# Patient Record
Sex: FEMALE | Race: WHITE | NOT HISPANIC OR LATINO | Employment: FULL TIME | URBAN - METROPOLITAN AREA
[De-identification: names, ages, dates, MRNs, and addresses within clinical notes are randomized per-mention and may not be internally consistent; named-entity substitution may affect disease eponyms.]

---

## 2022-01-27 ENCOUNTER — OFFICE VISIT (OUTPATIENT)
Dept: OBSTETRICS AND GYNECOLOGY | Facility: CLINIC | Age: 22
End: 2022-01-27
Payer: MEDICAID

## 2022-01-27 VITALS
HEIGHT: 61 IN | RESPIRATION RATE: 16 BRPM | WEIGHT: 135.81 LBS | DIASTOLIC BLOOD PRESSURE: 66 MMHG | SYSTOLIC BLOOD PRESSURE: 110 MMHG | BODY MASS INDEX: 25.64 KG/M2

## 2022-01-27 DIAGNOSIS — N92.1 BREAKTHROUGH BLEEDING ON DEPO PROVERA: ICD-10-CM

## 2022-01-27 DIAGNOSIS — Z30.9 ENCOUNTER FOR CONTRACEPTIVE MANAGEMENT, UNSPECIFIED TYPE: Primary | ICD-10-CM

## 2022-01-27 PROCEDURE — 99999 PR PBB SHADOW E&M-NEW PATIENT-LVL III: ICD-10-PCS | Mod: PBBFAC,,, | Performed by: STUDENT IN AN ORGANIZED HEALTH CARE EDUCATION/TRAINING PROGRAM

## 2022-01-27 PROCEDURE — 1159F MED LIST DOCD IN RCRD: CPT | Mod: CPTII,,, | Performed by: STUDENT IN AN ORGANIZED HEALTH CARE EDUCATION/TRAINING PROGRAM

## 2022-01-27 PROCEDURE — 1159F PR MEDICATION LIST DOCUMENTED IN MEDICAL RECORD: ICD-10-PCS | Mod: CPTII,,, | Performed by: STUDENT IN AN ORGANIZED HEALTH CARE EDUCATION/TRAINING PROGRAM

## 2022-01-27 PROCEDURE — 3078F PR MOST RECENT DIASTOLIC BLOOD PRESSURE < 80 MM HG: ICD-10-PCS | Mod: CPTII,,, | Performed by: STUDENT IN AN ORGANIZED HEALTH CARE EDUCATION/TRAINING PROGRAM

## 2022-01-27 PROCEDURE — 99203 PR OFFICE/OUTPT VISIT, NEW, LEVL III, 30-44 MIN: ICD-10-PCS | Mod: S$PBB,,, | Performed by: STUDENT IN AN ORGANIZED HEALTH CARE EDUCATION/TRAINING PROGRAM

## 2022-01-27 PROCEDURE — 3074F SYST BP LT 130 MM HG: CPT | Mod: CPTII,,, | Performed by: STUDENT IN AN ORGANIZED HEALTH CARE EDUCATION/TRAINING PROGRAM

## 2022-01-27 PROCEDURE — 99999 PR PBB SHADOW E&M-NEW PATIENT-LVL III: CPT | Mod: PBBFAC,,, | Performed by: STUDENT IN AN ORGANIZED HEALTH CARE EDUCATION/TRAINING PROGRAM

## 2022-01-27 PROCEDURE — 99203 OFFICE O/P NEW LOW 30 MIN: CPT | Mod: S$PBB,,, | Performed by: STUDENT IN AN ORGANIZED HEALTH CARE EDUCATION/TRAINING PROGRAM

## 2022-01-27 PROCEDURE — 3078F DIAST BP <80 MM HG: CPT | Mod: CPTII,,, | Performed by: STUDENT IN AN ORGANIZED HEALTH CARE EDUCATION/TRAINING PROGRAM

## 2022-01-27 PROCEDURE — 99203 OFFICE O/P NEW LOW 30 MIN: CPT | Mod: PBBFAC,PN | Performed by: STUDENT IN AN ORGANIZED HEALTH CARE EDUCATION/TRAINING PROGRAM

## 2022-01-27 PROCEDURE — 3008F PR BODY MASS INDEX (BMI) DOCUMENTED: ICD-10-PCS | Mod: CPTII,,, | Performed by: STUDENT IN AN ORGANIZED HEALTH CARE EDUCATION/TRAINING PROGRAM

## 2022-01-27 PROCEDURE — 3074F PR MOST RECENT SYSTOLIC BLOOD PRESSURE < 130 MM HG: ICD-10-PCS | Mod: CPTII,,, | Performed by: STUDENT IN AN ORGANIZED HEALTH CARE EDUCATION/TRAINING PROGRAM

## 2022-01-27 PROCEDURE — 3008F BODY MASS INDEX DOCD: CPT | Mod: CPTII,,, | Performed by: STUDENT IN AN ORGANIZED HEALTH CARE EDUCATION/TRAINING PROGRAM

## 2022-01-27 NOTE — PROGRESS NOTES
History & Physical  Gynecology      SUBJECTIVE:     Chief Complaint: BTB on Depo       History of Present Illness:    21 y.o. patient new to me presents today for BTB on depo. Has been on depo for 7 months. Having irregular spotty bleeding on depo. Looking to switch birth control.    7 months ago, uncomplicated.       Review of patient's allergies indicates:   Allergen Reactions    Keflex [cephalexin]        History reviewed. No pertinent past medical history.  Past Surgical History:   Procedure Laterality Date    TONSILLECTOMY       OB History    No obstetric history on file.       Family History   Problem Relation Age of Onset    Breast cancer Neg Hx     Colon cancer Neg Hx     Ovarian cancer Neg Hx      Social History     Tobacco Use    Smoking status: Current Some Day Smoker    Smokeless tobacco: Never Used   Substance Use Topics    Alcohol use: Not Currently    Drug use: Never       No current outpatient medications on file.     No current facility-administered medications for this visit.         Review of Systems:  Review of Systems   Constitutional: Negative for chills, fatigue and fever.   HENT: Negative for congestion.    Eyes: Negative for visual disturbance.   Respiratory: Negative for cough and shortness of breath.    Cardiovascular: Negative for chest pain and palpitations.   Gastrointestinal: Negative for abdominal distention, abdominal pain, constipation, diarrhea, nausea and vomiting.   Genitourinary: Negative for difficulty urinating, dysuria, hematuria, vaginal bleeding and vaginal discharge.   Skin: Negative for rash.   Neurological: Negative for dizziness, seizures, light-headedness and headaches.   Hematological: Does not bruise/bleed easily.   Psychiatric/Behavioral: Negative for dysphoric mood. The patient is not nervous/anxious.         OBJECTIVE:     Physical Exam:  Physical Exam  Vitals reviewed.   Constitutional:       General: She is not in acute distress.     Appearance:  Normal appearance. She is well-developed.   HENT:      Head: Normocephalic and atraumatic.   Cardiovascular:      Rate and Rhythm: Normal rate and regular rhythm.   Pulmonary:      Effort: Pulmonary effort is normal.   Abdominal:      General: There is no distension.      Palpations: Abdomen is soft.   Genitourinary:     Vagina: Normal.   Skin:     General: Skin is warm.   Neurological:      Mental Status: She is alert and oriented to person, place, and time.   Psychiatric:         Behavior: Behavior normal.         Thought Content: Thought content normal.         Judgment: Judgment normal.           ASSESSMENT:       ICD-10-CM ICD-9-CM    1. Encounter for contraceptive management, unspecified type  Z30.9 V25.9 Device Authorization Order   2. Breakthrough bleeding on depo provera  N92.1 626.6        Orders Placed This Encounter   Procedures    Device Authorization Order     Order Specific Question:   What device(s) needs authorization?     Answer:   Mirena -            Plan:      - discussed NSAIDs vs estrogen for BTB  On depo.  - wants to switch birth control  - The use of hormonal contraception has been fully discussed with the patient. We discussed all options including OCPs, transdermal patches, vaginal ring, Depo Provera injections, Implanon, and IUD. Warnings about anticipated minor side effects such as breakthrough spotting, nausea, breast tenderness, weight changes, acne, headaches, etc were given.  She has been told of the more serious potential side effects such as MI, stroke, and deep vein thrombosis, all of which are very unlikely.  She has been asked to report any signs of such serious problems immediately. The need for additional protection, such as a condom, to prevent exposure to sexually transmitted diseases has also been discussed- the patient has been clearly reminded that no hormonal contraceptive method can protect her against diseases such as HIV and others. She understands and wishes to  take the medication as prescribed. Patient was given the opportunity to ask questions. She wishes to begin IUD  - IUD mirena ordered, discussed procedure.     Anu Mathias M.D.  Obstetrics and Gynecology

## 2022-02-03 ENCOUNTER — TELEPHONE (OUTPATIENT)
Dept: OBSTETRICS AND GYNECOLOGY | Facility: CLINIC | Age: 22
End: 2022-02-03
Payer: MEDICAID

## 2022-02-03 NOTE — TELEPHONE ENCOUNTER
----- Message from Thais  sent at 2/3/2022 10:05 AM CST -----  Regarding: appt  Type: Needs Medical Advice    Who Called:      Best Call Back Number:   Additional Information: Requesting a call back from Nurse, regarding pt needs to schedule appt for Mirena to be placed ,please advise and call back

## 2022-02-18 ENCOUNTER — NURSE TRIAGE (OUTPATIENT)
Dept: ADMINISTRATIVE | Facility: CLINIC | Age: 22
End: 2022-02-18
Payer: MEDICAID

## 2022-02-18 NOTE — TELEPHONE ENCOUNTER
Pt with c/o of vaginal bleeding and states had a +home pregnancy test.  States fill >2 pads per hour, clots and cramping.  Care advice states for pt to go to ED now, agrees with this plan, going to [facility] ED now.  Instructed to call back for anything further.      Reason for Disposition   SEVERE vaginal bleeding (e.g., soaking 2 pads / hour, large blood clots) and present for 2 or more hours    Additional Information   Negative: Shock suspected (e.g., cold/pale/clammy skin, too weak to stand, low BP, rapid pulse)   Negative: Difficult to awaken or acting confused (e.g., disoriented, slurred speech)   Negative: Passed out (i.e., fainted, collapsed and was not responding)   Negative: Sounds like a life-threatening emergency to the triager   Negative: Vaginal bleeding and pregnant 20 or more weeks   Negative: Not pregnant or pregnancy status unknown   Negative: SEVERE abdominal pain (e.g., excruciating)    Protocols used: PREGNANCY - VAGINAL BLEEDING LESS THAN 20 WEEKS EGA-A-OH

## 2022-02-24 ENCOUNTER — PATIENT MESSAGE (OUTPATIENT)
Dept: OBSTETRICS AND GYNECOLOGY | Facility: CLINIC | Age: 22
End: 2022-02-24
Payer: MEDICAID

## 2022-02-25 ENCOUNTER — OFFICE VISIT (OUTPATIENT)
Dept: OBSTETRICS AND GYNECOLOGY | Facility: CLINIC | Age: 22
End: 2022-02-25
Payer: MEDICAID

## 2022-02-25 VITALS
WEIGHT: 134.69 LBS | SYSTOLIC BLOOD PRESSURE: 104 MMHG | RESPIRATION RATE: 16 BRPM | BODY MASS INDEX: 24.78 KG/M2 | DIASTOLIC BLOOD PRESSURE: 62 MMHG | HEIGHT: 62 IN

## 2022-02-25 DIAGNOSIS — Z30.430 ENCOUNTER FOR INSERTION OF MIRENA IUD: Primary | ICD-10-CM

## 2022-02-25 LAB
B-HCG UR QL: NEGATIVE
CTP QC/QA: YES

## 2022-02-25 PROCEDURE — 99999 PR PBB SHADOW E&M-EST. PATIENT-LVL III: CPT | Mod: PBBFAC,,, | Performed by: STUDENT IN AN ORGANIZED HEALTH CARE EDUCATION/TRAINING PROGRAM

## 2022-02-25 PROCEDURE — 3074F PR MOST RECENT SYSTOLIC BLOOD PRESSURE < 130 MM HG: ICD-10-PCS | Mod: CPTII,,, | Performed by: STUDENT IN AN ORGANIZED HEALTH CARE EDUCATION/TRAINING PROGRAM

## 2022-02-25 PROCEDURE — 3008F PR BODY MASS INDEX (BMI) DOCUMENTED: ICD-10-PCS | Mod: CPTII,,, | Performed by: STUDENT IN AN ORGANIZED HEALTH CARE EDUCATION/TRAINING PROGRAM

## 2022-02-25 PROCEDURE — 1159F MED LIST DOCD IN RCRD: CPT | Mod: CPTII,,, | Performed by: STUDENT IN AN ORGANIZED HEALTH CARE EDUCATION/TRAINING PROGRAM

## 2022-02-25 PROCEDURE — 99213 OFFICE O/P EST LOW 20 MIN: CPT | Mod: PBBFAC,PN | Performed by: STUDENT IN AN ORGANIZED HEALTH CARE EDUCATION/TRAINING PROGRAM

## 2022-02-25 PROCEDURE — 99499 UNLISTED E&M SERVICE: CPT | Mod: S$PBB,,, | Performed by: STUDENT IN AN ORGANIZED HEALTH CARE EDUCATION/TRAINING PROGRAM

## 2022-02-25 PROCEDURE — 1159F PR MEDICATION LIST DOCUMENTED IN MEDICAL RECORD: ICD-10-PCS | Mod: CPTII,,, | Performed by: STUDENT IN AN ORGANIZED HEALTH CARE EDUCATION/TRAINING PROGRAM

## 2022-02-25 PROCEDURE — 3074F SYST BP LT 130 MM HG: CPT | Mod: CPTII,,, | Performed by: STUDENT IN AN ORGANIZED HEALTH CARE EDUCATION/TRAINING PROGRAM

## 2022-02-25 PROCEDURE — 3078F PR MOST RECENT DIASTOLIC BLOOD PRESSURE < 80 MM HG: ICD-10-PCS | Mod: CPTII,,, | Performed by: STUDENT IN AN ORGANIZED HEALTH CARE EDUCATION/TRAINING PROGRAM

## 2022-02-25 PROCEDURE — 81025 URINE PREGNANCY TEST: CPT | Mod: PBBFAC,PN | Performed by: STUDENT IN AN ORGANIZED HEALTH CARE EDUCATION/TRAINING PROGRAM

## 2022-02-25 PROCEDURE — 58300 INSERTION OF IUD: ICD-10-PCS | Mod: S$PBB,,, | Performed by: STUDENT IN AN ORGANIZED HEALTH CARE EDUCATION/TRAINING PROGRAM

## 2022-02-25 PROCEDURE — 3008F BODY MASS INDEX DOCD: CPT | Mod: CPTII,,, | Performed by: STUDENT IN AN ORGANIZED HEALTH CARE EDUCATION/TRAINING PROGRAM

## 2022-02-25 PROCEDURE — 99999 PR PBB SHADOW E&M-EST. PATIENT-LVL III: ICD-10-PCS | Mod: PBBFAC,,, | Performed by: STUDENT IN AN ORGANIZED HEALTH CARE EDUCATION/TRAINING PROGRAM

## 2022-02-25 PROCEDURE — 99499 NO LOS: ICD-10-PCS | Mod: S$PBB,,, | Performed by: STUDENT IN AN ORGANIZED HEALTH CARE EDUCATION/TRAINING PROGRAM

## 2022-02-25 PROCEDURE — 3078F DIAST BP <80 MM HG: CPT | Mod: CPTII,,, | Performed by: STUDENT IN AN ORGANIZED HEALTH CARE EDUCATION/TRAINING PROGRAM

## 2022-02-25 PROCEDURE — 58300 INSERT INTRAUTERINE DEVICE: CPT | Mod: PBBFAC,PN | Performed by: STUDENT IN AN ORGANIZED HEALTH CARE EDUCATION/TRAINING PROGRAM

## 2022-02-25 RX ADMIN — LEVONORGESTREL 1 INTRA UTERINE DEVICE: 52 INTRAUTERINE DEVICE INTRAUTERINE at 09:02

## 2022-02-25 NOTE — PROCEDURES
Insertion of IUD    Date/Time: 2/25/2022 9:40 AM  Performed by: Anu Mathias MD  Authorized by: Anu Mathias MD     Consent:     Consent obtained:  Written    Consent given by:  Patient    Procedure risks and benefits discussed: yes      Patient questions answered: yes      Patient agrees, verbalizes understanding, and wants to proceed: yes      Educational handouts given: yes    Procedure:     Pelvic exam performed: yes      Negative urine pregnancy test: yes      Cervix cleaned and prepped: yes      Speculum placed in vagina: yes      Uterus sounded: yes      Uterus sound depth (cm):  8    IUD inserted with no complications: yes      IUD type:  Mirena    Strings trimmed: yes    1 Intra Uterine Device levonorgestreL 20 mcg/24 hours (7 yrs) 52 mg       Post-procedure:     Patient tolerated procedure well: yes      Patient will follow up after next period: yes

## 2022-03-16 ENCOUNTER — TELEPHONE (OUTPATIENT)
Dept: OBSTETRICS AND GYNECOLOGY | Facility: CLINIC | Age: 22
End: 2022-03-16
Payer: MEDICAID

## 2022-03-16 NOTE — TELEPHONE ENCOUNTER
Called pt, she is scheduled for this Friday March 18th at 8:20am.     ----- Message from Nahed Meléndez sent at 3/16/2022 10:47 AM CDT -----  Type:  Same Day Appointment Request    Caller is requesting a same day appointment.  Caller declined first available appointment listed below.      Name of Caller:  Patricia  When is the first available appointment?  3/30  Symptoms:  vaginal bleeding, this is the second day She said she is having to change tampon every hour  Best Call Back Number:  [phone]  Additional Information:   thank you!

## 2022-03-17 ENCOUNTER — PATIENT MESSAGE (OUTPATIENT)
Dept: OBSTETRICS AND GYNECOLOGY | Facility: CLINIC | Age: 22
End: 2022-03-17
Payer: MEDICAID

## 2022-03-18 ENCOUNTER — OFFICE VISIT (OUTPATIENT)
Dept: OBSTETRICS AND GYNECOLOGY | Facility: CLINIC | Age: 22
End: 2022-03-18
Payer: MEDICAID

## 2022-03-18 VITALS
WEIGHT: 134.25 LBS | BODY MASS INDEX: 24.7 KG/M2 | HEIGHT: 62 IN | RESPIRATION RATE: 16 BRPM | SYSTOLIC BLOOD PRESSURE: 108 MMHG | DIASTOLIC BLOOD PRESSURE: 64 MMHG

## 2022-03-18 DIAGNOSIS — Z30.431 IUD CHECK UP: Primary | ICD-10-CM

## 2022-03-18 PROCEDURE — 3078F DIAST BP <80 MM HG: CPT | Mod: CPTII,,, | Performed by: STUDENT IN AN ORGANIZED HEALTH CARE EDUCATION/TRAINING PROGRAM

## 2022-03-18 PROCEDURE — 3074F PR MOST RECENT SYSTOLIC BLOOD PRESSURE < 130 MM HG: ICD-10-PCS | Mod: CPTII,,, | Performed by: STUDENT IN AN ORGANIZED HEALTH CARE EDUCATION/TRAINING PROGRAM

## 2022-03-18 PROCEDURE — 1159F MED LIST DOCD IN RCRD: CPT | Mod: CPTII,,, | Performed by: STUDENT IN AN ORGANIZED HEALTH CARE EDUCATION/TRAINING PROGRAM

## 2022-03-18 PROCEDURE — 3008F PR BODY MASS INDEX (BMI) DOCUMENTED: ICD-10-PCS | Mod: CPTII,,, | Performed by: STUDENT IN AN ORGANIZED HEALTH CARE EDUCATION/TRAINING PROGRAM

## 2022-03-18 PROCEDURE — 99999 PR PBB SHADOW E&M-EST. PATIENT-LVL III: CPT | Mod: PBBFAC,,, | Performed by: STUDENT IN AN ORGANIZED HEALTH CARE EDUCATION/TRAINING PROGRAM

## 2022-03-18 PROCEDURE — 99213 OFFICE O/P EST LOW 20 MIN: CPT | Mod: PBBFAC,PN | Performed by: STUDENT IN AN ORGANIZED HEALTH CARE EDUCATION/TRAINING PROGRAM

## 2022-03-18 PROCEDURE — 99213 OFFICE O/P EST LOW 20 MIN: CPT | Mod: S$PBB,,, | Performed by: STUDENT IN AN ORGANIZED HEALTH CARE EDUCATION/TRAINING PROGRAM

## 2022-03-18 PROCEDURE — 3074F SYST BP LT 130 MM HG: CPT | Mod: CPTII,,, | Performed by: STUDENT IN AN ORGANIZED HEALTH CARE EDUCATION/TRAINING PROGRAM

## 2022-03-18 PROCEDURE — 3078F PR MOST RECENT DIASTOLIC BLOOD PRESSURE < 80 MM HG: ICD-10-PCS | Mod: CPTII,,, | Performed by: STUDENT IN AN ORGANIZED HEALTH CARE EDUCATION/TRAINING PROGRAM

## 2022-03-18 PROCEDURE — 99213 PR OFFICE/OUTPT VISIT, EST, LEVL III, 20-29 MIN: ICD-10-PCS | Mod: S$PBB,,, | Performed by: STUDENT IN AN ORGANIZED HEALTH CARE EDUCATION/TRAINING PROGRAM

## 2022-03-18 PROCEDURE — 1159F PR MEDICATION LIST DOCUMENTED IN MEDICAL RECORD: ICD-10-PCS | Mod: CPTII,,, | Performed by: STUDENT IN AN ORGANIZED HEALTH CARE EDUCATION/TRAINING PROGRAM

## 2022-03-18 PROCEDURE — 99999 PR PBB SHADOW E&M-EST. PATIENT-LVL III: ICD-10-PCS | Mod: PBBFAC,,, | Performed by: STUDENT IN AN ORGANIZED HEALTH CARE EDUCATION/TRAINING PROGRAM

## 2022-03-18 PROCEDURE — 3008F BODY MASS INDEX DOCD: CPT | Mod: CPTII,,, | Performed by: STUDENT IN AN ORGANIZED HEALTH CARE EDUCATION/TRAINING PROGRAM

## 2022-03-18 NOTE — PROGRESS NOTES
History & Physical  Gynecology      SUBJECTIVE:     Chief Complaint: Bleeding after Mirena IUD 02/25       History of Present Illness:    Here today for brown spotting and discharge since IUD placed. No cramping, no bright red bleeding. No pain.     Review of patient's allergies indicates:   Allergen Reactions    Keflex [cephalexin]        No past medical history on file.  Past Surgical History:   Procedure Laterality Date    TONSILLECTOMY       OB History    No obstetric history on file.       Family History   Problem Relation Age of Onset    Breast cancer Neg Hx     Colon cancer Neg Hx     Ovarian cancer Neg Hx      Social History     Tobacco Use    Smoking status: Current Some Day Smoker    Smokeless tobacco: Never Used   Substance Use Topics    Alcohol use: Not Currently    Drug use: Never       No current outpatient medications on file.     Current Facility-Administered Medications   Medication    levonorgestreL (MIRENA) 20 mcg/24 hours (7 yrs) 52 mg IUD 1 Intra Uterine Device         Review of Systems:  Review of Systems   Constitutional: Negative for chills, fatigue and fever.   HENT: Negative for congestion.    Eyes: Negative for visual disturbance.   Respiratory: Negative for cough and shortness of breath.    Cardiovascular: Negative for chest pain and palpitations.   Gastrointestinal: Negative for abdominal distention, abdominal pain, constipation, diarrhea, nausea and vomiting.   Genitourinary: Negative for difficulty urinating, dysuria, hematuria, vaginal bleeding and vaginal discharge.   Skin: Negative for rash.   Neurological: Negative for dizziness, seizures, light-headedness and headaches.   Hematological: Does not bruise/bleed easily.   Psychiatric/Behavioral: Negative for dysphoric mood. The patient is not nervous/anxious.         OBJECTIVE:     Physical Exam:  Physical Exam  Vitals reviewed.   Constitutional:       General: She is not in acute distress.     Appearance: Normal appearance.  She is well-developed.   HENT:      Head: Normocephalic and atraumatic.   Cardiovascular:      Rate and Rhythm: Normal rate and regular rhythm.   Pulmonary:      Effort: Pulmonary effort is normal.   Abdominal:      General: There is no distension.      Palpations: Abdomen is soft.   Genitourinary:     Vagina: Normal.      Comments: Normal external female genitalia, normal hair distribution. Vaginal mucosa pink, moist, well rugated, scant white physiologic discharge. No blood in vault. Cervix pink, non-friable, without lesion. Scant brown/old blood discharge in vault. IUD strings present   Skin:     General: Skin is warm.   Neurological:      Mental Status: She is alert and oriented to person, place, and time.   Psychiatric:         Behavior: Behavior normal.         Thought Content: Thought content normal.         Judgment: Judgment normal.           ASSESSMENT:       ICD-10-CM ICD-9-CM    1. IUD check up  Z30.431 V25.42        No orders of the defined types were placed in this encounter.          Plan:      - discussed normal bleeding profile of IUD  - no BRB or cramping  - if worsens, will get US to confirm position     Anu Mathias M.D.  Obstetrics and Gynecology

## 2022-06-21 ENCOUNTER — TELEPHONE (OUTPATIENT)
Dept: OBSTETRICS AND GYNECOLOGY | Facility: CLINIC | Age: 22
End: 2022-06-21
Payer: MEDICAID

## 2022-06-21 NOTE — TELEPHONE ENCOUNTER
Returned pts call and sent message to Nurse Susie to override and add pt to schedule tomorrow at 2pm, pt c/o lower abd pain.    ----- Message from Rashaun Talavera sent at 6/21/2022  8:49 AM CDT -----  Contact: pt at [phone]  Type:  Same Day Appointment Request  Caller is requesting a same day appointment.  Caller declined first available appointment listed below.    Name of Caller:  pt  When is the first available appointment?  7/21/22  Symptoms:  lower abdomen pain  Best Call Back Number:  [phone]  Additional Information:  pt is calling the office to schedule an appt due to her having lower abdomen pain and the date of 7/21/22 comes up she states she needs to be seen today. Please call back  and advise.

## 2022-07-14 ENCOUNTER — TELEPHONE (OUTPATIENT)
Dept: OBSTETRICS AND GYNECOLOGY | Facility: CLINIC | Age: 22
End: 2022-07-14
Payer: MEDICAID

## 2022-07-14 NOTE — TELEPHONE ENCOUNTER
Patient complaints of vaginal odor and discharge. Scheduled appointment for patient. Patient verbalized understanding.     ----- Message from Jessica Soto sent at 7/14/2022  2:42 PM CDT -----  Type:  Patient Returning Call    Who Called: patient   Who Left Message for Patient:  Nila  Does the patient know what this is regarding?:  yes   Best Call Back Number:  [phone]   Additional Information:  please advise-thank you

## 2022-10-10 ENCOUNTER — TELEPHONE (OUTPATIENT)
Dept: OBSTETRICS AND GYNECOLOGY | Facility: CLINIC | Age: 22
End: 2022-10-10
Payer: MEDICAID

## 2022-10-10 NOTE — TELEPHONE ENCOUNTER
Attempted to contact patient. Left message.    ----- Message from Danelle Garcia sent at 10/10/2022 12:40 PM CDT -----  Contact: Patient  Type:  Sooner Apoointment Request    Caller is requesting a sooner appointment.  Caller declined first available appointment listed below.  Caller will not accept being placed on the waitlist and is requesting a message be sent to doctor    Name of Caller: Patient     When is the first available appointment? 11/3    Symptoms: brown bloody discharged     Would the patient rather a call back or a response via [facility]? Call     Best Call Back Number:[phone] (home)      Additional Information:

## 2024-06-10 DIAGNOSIS — E11.65 HYPERGLYCEMIA DUE TO DIABETES MELLITUS: Primary | ICD-10-CM

## 2024-06-11 ENCOUNTER — TELEPHONE (OUTPATIENT)
Dept: ENDOCRINOLOGY | Facility: CLINIC | Age: 24
End: 2024-06-11
Payer: MEDICAID

## 2024-06-11 DIAGNOSIS — E10.9 TYPE 1 DIABETES MELLITUS WITHOUT COMPLICATION: Primary | ICD-10-CM

## 2024-06-11 RX ORDER — LANCETS
EACH MISCELLANEOUS
Qty: 150 EACH | Refills: 12 | Status: SHIPPED | OUTPATIENT
Start: 2024-06-11

## 2024-06-11 RX ORDER — INSULIN PUMP SYRINGE, 3 ML
EACH MISCELLANEOUS
Qty: 1 EACH | Refills: 0 | Status: SHIPPED | OUTPATIENT
Start: 2024-06-11 | End: 2025-06-11

## 2024-06-11 RX ORDER — BLOOD-GLUCOSE SENSOR
1 EACH MISCELLANEOUS
Qty: 3 EACH | Refills: 12 | Status: SHIPPED | OUTPATIENT
Start: 2024-06-11 | End: 2025-06-11

## 2024-06-12 ENCOUNTER — CLINICAL SUPPORT (OUTPATIENT)
Dept: DIABETES | Facility: CLINIC | Age: 24
End: 2024-06-12
Payer: MEDICAID

## 2024-06-12 ENCOUNTER — TELEPHONE (OUTPATIENT)
Dept: ENDOCRINOLOGY | Facility: CLINIC | Age: 24
End: 2024-06-12
Payer: MEDICAID

## 2024-06-12 VITALS — HEIGHT: 61 IN | BODY MASS INDEX: 26.3 KG/M2 | WEIGHT: 139.31 LBS

## 2024-06-12 DIAGNOSIS — E11.65 HYPERGLYCEMIA DUE TO DIABETES MELLITUS: ICD-10-CM

## 2024-06-12 PROCEDURE — 99999 PR PBB SHADOW E&M-EST. PATIENT-LVL II: CPT | Mod: PBBFAC,,, | Performed by: DIETITIAN, REGISTERED

## 2024-06-12 PROCEDURE — 99212 OFFICE O/P EST SF 10 MIN: CPT | Mod: PBBFAC,PO | Performed by: DIETITIAN, REGISTERED

## 2024-06-12 PROCEDURE — G0108 DIAB MANAGE TRN  PER INDIV: HCPCS | Mod: PBBFAC,PO | Performed by: DIETITIAN, REGISTERED

## 2024-06-12 PROCEDURE — 99999PBSHW PR PBB SHADOW TECHNICAL ONLY FILED TO HB: Mod: PBBFAC,,,

## 2024-06-12 NOTE — PROGRESS NOTES
"Diabetes Care Specialist Progress Note  Author: Karma Neil RD, CDE  Date: 6/12/2024    Program Intake  Reason for Diabetes Program Visit:: Initial Diabetes Assessment-Patient referred for basic dm education for new onset type 1 dm.  She was hospitalized over the weekend due to blood sugar over 600.  She is scheduled to see Endocrinology Friday.  Current diabetes risk level:: high  In the last 12 months, have you:: been admitted to a hospital  Was the ER or hospital admission related to diabetes?: Yes  Continuous Glucose Monitoring  Patient has CGM: Yes  Personal CGM type::  (Dexcom G7)    Lab Results   Component Value Date    HGBA1C 10.7 (H) 06/09/2024     Clinical    Weight: 63.2 kg (139 lb 5.3 oz)   Height: 5' 1" (154.9 cm)   Body mass index is 26.33 kg/m².    Problem Review  Reviewed Problem List with Patient: yes  Active comorbidities affecting diabetes self-care.: no  Reviewed health maintenance: yes    Clinical Assessment  Current Diabetes Treatment: Insulin-Started with [facility] this weekend.  Lantus was started at 30 and then decreased to 15 2 days ago.  Dexcom was reviewed today and Lantus increased to 20 and base dose of Humalog was added- 2 units with meals plus ss  Have you ever experienced hypoglycemia?: yes-one episode at night after hospital  Are you able to tell when your blood sugar is low?: No    Medication Information  How do you obtain your medications?: Patient drives, Family picks up  Do you sometimes have difficulty refilling your medications?: No  Medication adherence impacting ability to self-manage diabetes?: No    Labs  Lab Compliance Barriers: No    Nutritional Status  Diet: Regular-Patient has cut out cokes and other high sugar drinks since hospital visit.  She has also been adjusting high starch food in her diet.  Admits she loves bread and eats a lot of red beans and rice  Meal Plan 24 Hour Recall:   Meal Plan 24 Hour Recall - Breakfast:  3 pb crackers/ was eating 2 cups of cereal " prior to diagnosis  Meal Plan 24 Hour Recall - Dinner:  red beans and rice  Meal Plan 24 Hour Recall - Snack:  SF pudding, apples/pb  Change in appetite?: No  Dentation:: Intact  Recent Changes in Weight: No Recent Weight Change  Current nutritional status an area of need that is impacting patient's ability to self-manage diabetes?: No    Additional Social History    Support  Does anyone support you with your diabetes care?: yes  Who supports you?: parent  Who takes you to your medical appointments?: parent, family member  Does the current support meet the patient's needs?: Yes  Is Support an area impacting ability to self-manage diabetes?: No    Access to Mass Media & Technology  Does the patient have access to any of the following devices or technologies?: Smart phone  Media or technology needs impacting ability to self-manage diabetes?: No    Cognitive/Behavioral Health  Alert and Oriented: Yes  Difficulty Thinking: No  Requires Prompting: No  Requires assistance for routine expression?: No  Cognitive or behavioral barriers impacting ability to self-manage diabetes?: No    Culture/Pentecostal  Culture or Temple beliefs that may impact ability to access healthcare: No    Communication  Language preference: English  Hearing Problems: No  Vision Problems: No  Communication needs impacting ability to self-manage diabetes?: No    Health Literacy  Preferred Learning Method: Face to Face  How often do you need to have someone help you read instructions, pamphlets, or written material from your doctor or pharmacy?: Never  Health literacy needs impacting ability to self-manage diabetes?: No    Diabetes Self-Management Skills Assessment    Diabetes Disease Process/Treatment Options  Patient/caregiver able to state what happens when someone has diabetes.: yes  Patient/caregiver knows what type of diabetes they have.: yes  Diabetes Type : Type I  Patient/caregiver able to identify at least three signs and symptoms of  diabetes.: no (reviewed)  Diabetes Disease Process/Treatment Options: Skills Assessment Completed: Yes  Assessment indicates:: Adequate understanding  Area of need?: Yes    Nutrition/Healthy Eating  Challenges to healthy eating:: portion control, snacking between meals and at night, eating out  Method of carbohydrate measurement:: no method  Patient can identify foods that impact blood sugar.: yes  Patient-identified foods:: starches (bread, pasta, rice, cereal), soda, fruit/fruit juice, sweets  Nutrition/Healthy Eating Skills Assessment Completed:: Yes  Assessment indicates:: Instruction Needed  Area of need?: Yes    Physical Activity/Exercise  Patient's daily activity level:: lightly active  Patient formally exercises outside of work.: no  Patient can identify reasons why exercise/physical activity is important in diabetes management.: yes  Physical Activity/Exercise Skills Assessment Completed: : Yes  Assessment indicates:: Adequate understanding  Area of need?: Yes    Medications  Patient is able to describe current diabetes management routine.: yes  Diabetes management routine:: insulin  Patient is able to identify current diabetes medications, dosages, and appropriate timing of medications.: yes  Patient understands the purpose of the medications taken for diabetes.: yes  Patient reports problems or concerns with current medication regimen.: no  Medication Skills Assessment Completed:: Yes  Assessment indicates:: Instruction Needed  Area of need?: Yes    Home Blood Glucose Monitoring  Patient states that blood sugar is checked at home daily.: yes  Monitoring Method:: personal continuous glucose monitor  Personal CGM type::  (Dexcom G7)  Patient is able to use personal CGM appropriately.: yes  CGM Report reviewed?: yes (Claire reviewed 4 days of data while in clinic today)  Home Blood Glucose Monitoring Skills Assessment Completed: : Yes  Assessment indicates:: Adequate understanding  Area of need?: No    Acute  Complications  Patient is able to identify types of acute complications: No (reviewed symptoms, ,prevention and treatment of hypoglycemia)  Acute Complications Skills Assessment Completed: : Yes  Assessment indicates:: Adequate understanding  Area of need?: No    Chronic Complications  Patient can identify major chronic complications of diabetes.: no (new diagnosis- reviewed today)  Chronic Complications Skills Assessment Completed: : Yes  Assessment indicates:: Adequate understanding  Area of need?: No    Psychosocial/Coping  Patient can identify ways of coping with chronic disease.: yes  Patient-stated ways of coping with chronic disease:: support from loved ones  Psychosocial/Coping Skills Assessment Completed: : Yes  Assessment indicates:: Adequate understanding  Area of need?: No    Assessment Summary and Plan    Based on today's diabetes care assessment, the following areas of need were identified:          6/12/2024    12:01 AM   Social   Support No   Access to Mass Media/Tech No   Cognitive/Behavioral Health No   Culture/[place]os[place] No   Communication No   Health Literacy No          6/12/2024    12:01 AM   Clinical   Medication Adherence No   Lab Compliance No   Nutritional Status No          6/12/2024    12:01 AM   Diabetes Self-Management Skills   Diabetes Disease Process/Treatment Options Yes- goal blood sugars and A1c goal reviewed   Nutrition/Healthy Eating Yes- see care plan   Physical Activity/Exercise Yes- encouraged increased activity to help enhance improved blood sugar control   Medication Yes- insulin adjusted today after Dexcom reports reviewed/ will see Endocrinology Friday for further adjustments   Home Blood Glucose Monitoring No   Acute Complications No   Chronic Complications No   Psychosocial/Coping No      Today's interventions were provided through individual discussion, instruction, and written materials were provided.      Patient verbalized understanding of instruction and written  materials.  Pt was able to return back demonstration of instructions today. Patient understood key points, needs reinforcement and further instruction.     Diabetes Self-Management Care Plan:    Today's Diabetes Self-Management Care Plan was developed with Patricia's input. Patricia has agreed to work toward the following goal(s) to improve his/her overall diabetes control.      Care Plan: Diabetes Management   Updates made since 5/13/2024 12:00 AM        Problem: Healthy Eating         Goal: Eat 3 meals daily with <45g of Carbohydrate per meal.  Limit snacks to less than 15g.  Continue with sugar free drink options in place of Coke    Start Date: 6/12/2024   Expected End Date: 9/11/2024   Priority: Medium   Barriers: No Barriers Identified        Task: Reviewed the sources and role of Carbohydrate, Protein, and Fat and how each nutrient impacts blood sugar. Completed 6/12/2024        Task: Provided visual examples using dry measuring cups, food models, and other familiar objects such as computer mouse, deck or cards, tennis ball etc. to help with visualization of portions. Completed 6/12/2024        Task: Explained how to count carbohydrates using the food label and the use of dry measuring cups for accurate carb counting. Completed 6/12/2024        Task: Discussed use of [employer] elif to help with out to eat options.  Elif downloaded at visit today Completed 6/12/2024        Task: Review the importance of balancing carbohydrates with each meal using portion control techniques to count servings of carbohydrate and label reading to identify serving size and amount of total carbs per serving. Completed 6/12/2024        Task: Provided Sample plate method and reviewed the use of the plate to estimate amounts of carbohydrate per meal. Completed 6/12/2024        Problem: Medications         Long-Range Goal: Patient Agrees to take insulin as prescribed.    Start Date: 6/12/2024   Priority: High   Barriers: No Barriers  Identified   Note:    Dexcom reports reviewed by Claire.  She would like her to add a base dose of 2 units before meals.  She understands she will only take this when eating and before a meal.  She will use sliding scale of 1:50 over 150 in addition.  Reviewed use of sliding scale and specific examples provided.  Discussed that she can add her meal time insulin doses into her alejandra for review at clinic visits.       Task: Reviewed with patient all current diabetes medications and provided basic review of the purpose, dosage, frequency, side effects, and storage of both oral and injectable diabetes medications. Completed 6/12/2024        Task: Discussed guidelines for preventing, detecting and treating hypoglycemia and hyperglycemia and reviewed the importance of meal and medication timing with diabetes mediations for prevention of hypoglycemia and maximum drug benefit. Completed 6/12/2024        Follow Up Plan     Follow up in about 6 weeks (around 7/24/2024).  Will see Claire this Friday to establish care.  Will try to coordinate f/u visits for education visits on the same day as Endocrinology to minimize trips to the clinic if possible.  Ideally would like to see her back in 6-8 weeks to assess how she is doing with diet modification.  Written materials provided and encouraged patient too call with questions/concerns in interim.        Today's care plan and follow up schedule was discussed with patient.  Patricia verbalized understanding of the care plan, goals, and agrees to follow up plan.        The patient was encouraged to communicate with his/her health care provider/physician and care team regarding his/her condition(s) and treatment.  I provided the patient with my contact information today and encouraged to contact me via phone or [facility] Patient Portal as needed.     Length of Visit   Total Time: 60 Minutes

## 2024-06-14 ENCOUNTER — LAB VISIT (OUTPATIENT)
Dept: LAB | Facility: HOSPITAL | Age: 24
End: 2024-06-14
Attending: NURSE PRACTITIONER
Payer: MEDICAID

## 2024-06-14 ENCOUNTER — OFFICE VISIT (OUTPATIENT)
Dept: ENDOCRINOLOGY | Facility: CLINIC | Age: 24
End: 2024-06-14
Payer: MEDICAID

## 2024-06-14 VITALS
HEIGHT: 61 IN | SYSTOLIC BLOOD PRESSURE: 100 MMHG | WEIGHT: 142.31 LBS | BODY MASS INDEX: 26.87 KG/M2 | DIASTOLIC BLOOD PRESSURE: 68 MMHG

## 2024-06-14 DIAGNOSIS — E10.9 TYPE 1 DIABETES MELLITUS WITHOUT COMPLICATION: Primary | ICD-10-CM

## 2024-06-14 DIAGNOSIS — E11.9 TYPE 2 DIABETES MELLITUS WITHOUT COMPLICATION, WITH LONG-TERM CURRENT USE OF INSULIN: Primary | ICD-10-CM

## 2024-06-14 DIAGNOSIS — Z79.4 TYPE 2 DIABETES MELLITUS WITHOUT COMPLICATION, WITH LONG-TERM CURRENT USE OF INSULIN: Primary | ICD-10-CM

## 2024-06-14 DIAGNOSIS — E10.9 TYPE 1 DIABETES MELLITUS WITHOUT COMPLICATION: ICD-10-CM

## 2024-06-14 LAB
ALBUMIN SERPL BCP-MCNC: 4 G/DL (ref 3.5–5.2)
ALP SERPL-CCNC: 72 U/L (ref 55–135)
ALT SERPL W/O P-5'-P-CCNC: 15 U/L (ref 10–44)
ANION GAP SERPL CALC-SCNC: 10 MMOL/L (ref 8–16)
AST SERPL-CCNC: 17 U/L (ref 10–40)
BILIRUB SERPL-MCNC: 0.6 MG/DL (ref 0.1–1)
BUN SERPL-MCNC: 11 MG/DL (ref 6–20)
C PEPTIDE SERPL-MCNC: 1.43 NG/ML (ref 0.78–5.19)
CALCIUM SERPL-MCNC: 9.7 MG/DL (ref 8.7–10.5)
CHLORIDE SERPL-SCNC: 105 MMOL/L (ref 95–110)
CO2 SERPL-SCNC: 23 MMOL/L (ref 23–29)
CREAT SERPL-MCNC: 0.8 MG/DL (ref 0.5–1.4)
EST. GFR  (NO RACE VARIABLE): >60 ML/MIN/1.73 M^2
GLUCOSE SERPL-MCNC: 154 MG/DL (ref 70–110)
POTASSIUM SERPL-SCNC: 4.4 MMOL/L (ref 3.5–5.1)
PROT SERPL-MCNC: 7.2 G/DL (ref 6–8.4)
SODIUM SERPL-SCNC: 138 MMOL/L (ref 136–145)
TSH SERPL DL<=0.005 MIU/L-ACNC: 1.87 UIU/ML (ref 0.4–4)

## 2024-06-14 PROCEDURE — 3078F DIAST BP <80 MM HG: CPT | Mod: CPTII,,, | Performed by: NURSE PRACTITIONER

## 2024-06-14 PROCEDURE — 95251 CONT GLUC MNTR ANALYSIS I&R: CPT | Mod: ,,, | Performed by: NURSE PRACTITIONER

## 2024-06-14 PROCEDURE — 86341 ISLET CELL ANTIBODY: CPT | Mod: 91 | Performed by: NURSE PRACTITIONER

## 2024-06-14 PROCEDURE — 99999 PR PBB SHADOW E&M-EST. PATIENT-LVL III: CPT | Mod: PBBFAC,,, | Performed by: NURSE PRACTITIONER

## 2024-06-14 PROCEDURE — 99213 OFFICE O/P EST LOW 20 MIN: CPT | Mod: PBBFAC,PO | Performed by: NURSE PRACTITIONER

## 2024-06-14 PROCEDURE — 84681 ASSAY OF C-PEPTIDE: CPT | Performed by: NURSE PRACTITIONER

## 2024-06-14 PROCEDURE — 3046F HEMOGLOBIN A1C LEVEL >9.0%: CPT | Mod: CPTII,,, | Performed by: NURSE PRACTITIONER

## 2024-06-14 PROCEDURE — 1159F MED LIST DOCD IN RCRD: CPT | Mod: CPTII,,, | Performed by: NURSE PRACTITIONER

## 2024-06-14 PROCEDURE — 3074F SYST BP LT 130 MM HG: CPT | Mod: CPTII,,, | Performed by: NURSE PRACTITIONER

## 2024-06-14 PROCEDURE — 3008F BODY MASS INDEX DOCD: CPT | Mod: CPTII,,, | Performed by: NURSE PRACTITIONER

## 2024-06-14 PROCEDURE — 99205 OFFICE O/P NEW HI 60 MIN: CPT | Mod: S$PBB,,, | Performed by: NURSE PRACTITIONER

## 2024-06-14 PROCEDURE — 80053 COMPREHEN METABOLIC PANEL: CPT | Performed by: NURSE PRACTITIONER

## 2024-06-14 PROCEDURE — 84443 ASSAY THYROID STIM HORMONE: CPT | Performed by: NURSE PRACTITIONER

## 2024-06-14 PROCEDURE — 86341 ISLET CELL ANTIBODY: CPT | Performed by: NURSE PRACTITIONER

## 2024-06-14 RX ORDER — INSULIN GLARGINE 100 [IU]/ML
30 INJECTION, SOLUTION SUBCUTANEOUS
COMMUNITY
Start: 2024-06-09 | End: 2024-12-06

## 2024-06-14 RX ORDER — INSULIN LISPRO 100 [IU]/ML
INJECTION, SOLUTION INTRAVENOUS; SUBCUTANEOUS
COMMUNITY
Start: 2024-06-09

## 2024-06-14 NOTE — PATIENT INSTRUCTIONS
Continue Lantus 20 units  at bedtime      COnt Humalog 2 with breakfast and lunch,       4 units with supper       Novolog/or Humalog/or Apridra  USE ONLY    Dose is based on level of glucose before meals only (meaning no food or drink for 4 hours). This dose may be added to a standard meal dose if ordered    150-200=+1  201-250=+2  251-300=+3  301-350=+4  351-400=+5

## 2024-06-14 NOTE — PROGRESS NOTES
"Subjective     Patient ID: Patricia Comer is a 23 y.o. female.    Chief Complaint:  Diabetes     HPI  Pt is a 23 y.o. WF  with a diagnosis of diabetes mellitus diagnosed approximately last week after presenting to ER in [place] with polyuria and polydipsia.  Glucose was > 600 and A1c 10%. Pt started on insulin at that time       Interim Events: June 14, 2024:  Pt new to me with presumably Type 1 Diabetes based on age, and no family hx of Type 1 or Type 2 DM.  No recent viral illnesses noted but was seen for nephrolithiasis in April for which was treated, but it was noted that she had also just completed abx  prior r/t  UTI and STI recently before.   Pt is currently using a sensor and did receive some basic education related to insulin and sensor use. Isolated hypglycemia r/t aggressive SS on d/c     Current DM meds:   lantus 20 qhs, Novolog 2-2-2 plus ss 1:50.        Failed DM meds:  none        Back up plan for insulin pump hiatus:   Statin: none        Not tolerated statin : none   ACE/ARB:none        Not tolerated ACE/ARB: none   Known Diabetic complications: none         Passwords for Tech Accounts: na      CGMS Interpretation:           Sensor/Pump Interpretation or Prominent Theme: Glucoses are running markedly high overnight starting before supper and last until morning.  Day readings are often in goal--though high end of goal.  ONe isolated event of hypoglycemia related to aggressive Sliding scale  discharge.   Review of Systems   Constitutional:  Negative for activity change and fatigue.        Blood pressure 100/68, height 5' 1" (1.549 m), weight 64.5 kg (142 lb 4.9 oz).   HENT:  Negative for hearing loss and trouble swallowing.    Eyes:  Negative for photophobia and visual disturbance.        Last Eye Exam:    Respiratory:  Negative for cough and shortness of breath.    Cardiovascular:  Negative for chest pain and palpitations.   Gastrointestinal:  Negative for constipation and diarrhea. " "  Genitourinary:  Negative for frequency and urgency.   Musculoskeletal:  Negative for arthralgias and myalgias.   Integumentary:  Negative for rash and wound.   Neurological:  Negative for weakness and numbness.   Psychiatric/Behavioral:  Negative for sleep disturbance. The patient is not nervous/anxious.           Objective     Physical Exam  Constitutional:       Appearance: Normal appearance. She is normal weight.   HENT:      Head: Normocephalic and atraumatic.      Nose: Nose normal.      Mouth/Throat:      Mouth: Mucous membranes are moist.      Pharynx: Oropharynx is clear.   Eyes:      Extraocular Movements: Extraocular movements intact.      Conjunctiva/sclera: Conjunctivae normal.      Pupils: Pupils are equal, round, and reactive to light.   Neck:      Vascular: No carotid bruit.   Cardiovascular:      Rate and Rhythm: Normal rate and regular rhythm.      Pulses: Normal pulses.      Heart sounds: Normal heart sounds.   Pulmonary:      Effort: Pulmonary effort is normal.      Breath sounds: Normal breath sounds.   Musculoskeletal:         General: Normal range of motion.      Cervical back: Normal range of motion and neck supple.      Right lower leg: No edema.      Left lower leg: No edema.      Comments: Feet:   Lymphadenopathy:      Cervical: No cervical adenopathy.   Skin:     General: Skin is warm and dry.   Neurological:      General: No focal deficit present.      Mental Status: She is alert and oriented to person, place, and time.   Psychiatric:         Mood and Affect: Mood normal.         Behavior: Behavior normal.         Thought Content: Thought content normal.         Judgment: Judgment normal.         /68 (BP Location: Right arm, Patient Position: Sitting, BP Method: Medium (Manual))   Ht 5' 1" (1.549 m)   Wt 64.5 kg (142 lb 4.9 oz)   BMI 26.89 kg/m²     Hemoglobin A1C   Date Value Ref Range Status   06/09/2024 10.7 (H) <=6.5 % Final       Chemistry        Component Value Date/Time " "    09/13/2022 1516     02/18/2022 1314    K 4.9 09/13/2022 1516    K 3.7 02/18/2022 1314     (H) 09/13/2022 1516     02/18/2022 1314    CO2 17 (L) 09/13/2022 1516    CO2 23 02/18/2022 1314    BUN 8 09/13/2022 1516    BUN 12 02/18/2022 1314    CREATININE 0.67 09/13/2022 1516    CREATININE 0.72 02/18/2022 1314    GLU 89 02/18/2022 1314        Component Value Date/Time    CALCIUM 9.3 09/13/2022 1516    CALCIUM 9.0 02/18/2022 1314    ALKPHOS 69 02/18/2022 1314    AST 26 02/18/2022 1314    ALT 15 02/18/2022 1314    BILITOT 1.5 (H) 02/18/2022 1314    ESTGFRAFRICA 127 09/13/2022 1516    ESTGFRAFRICA >60 02/18/2022 1314    EGFRNONAA >60 02/18/2022 1314          No results found for: "CHOL"  No results found for: "HDL"  No results found for: "LDLCALC"  No results found for: "TRIG"  No results found for: "CHOLHDL"  No results found for: "MICALBCREAT"  No results found for: "TSH"  No results found for: "[id number]"       Assessment and Plan     1. Type 2 diabetes mellitus without complication, with long-term current use of insulin        ORDERS 06/14/2024   ICA, GAC, Z transporter, cmp, tsh, today   2 weeks----DE--review carb counting     F/u me 1 month        60 min  wit a lot of counseling on action of insulin, causes of diabetes, etc.     "

## 2024-06-19 LAB — PANC ISLET CELL IGG SER-ACNC: NORMAL

## 2024-06-20 LAB — ZNT8 AB SERPL IA-ACNC: 17.9 U/ML

## 2024-06-21 LAB — GAD65 AB SER-SCNC: 4.55 NMOL/L

## 2024-06-26 ENCOUNTER — CLINICAL SUPPORT (OUTPATIENT)
Dept: DIABETES | Facility: CLINIC | Age: 24
End: 2024-06-26
Payer: MEDICAID

## 2024-06-26 ENCOUNTER — OFFICE VISIT (OUTPATIENT)
Dept: ENDOCRINOLOGY | Facility: CLINIC | Age: 24
End: 2024-06-26
Payer: MEDICAID

## 2024-06-26 DIAGNOSIS — E10.9 TYPE 1 DIABETES MELLITUS WITHOUT COMPLICATION: Primary | ICD-10-CM

## 2024-06-26 DIAGNOSIS — Z79.4 TYPE 2 DIABETES MELLITUS WITHOUT COMPLICATION, WITH LONG-TERM CURRENT USE OF INSULIN: ICD-10-CM

## 2024-06-26 DIAGNOSIS — E11.9 TYPE 2 DIABETES MELLITUS WITHOUT COMPLICATION, WITH LONG-TERM CURRENT USE OF INSULIN: ICD-10-CM

## 2024-06-26 NOTE — PROGRESS NOTES
Diabetes Care Specialist Progress Note  Author: Karma Neil RD, CDE  Date: 6/26/2024    ..Diabetes Care Specialist Virtual Visit Note       The patient location is: home in [place]  The chief complaint leading to consultation is: Diabetes  Visit type: audiovisual  Total time spent with patient: 30 min   Each patient to whom he or she provides medical services by telemedicine is:  (1) informed of the relationship between the physician and patient and the respective role of any other health care provider with respect to management of the patient; and (2) notified that he or she may decline to receive medical services by telemedicine and may withdraw from such care at any time.     Program Intake  Reason for Diabetes Program Visit:: Intervention-Patient was seen virtually for 2 week f/u visit today.  She was initially assessed on 6/12 and has met with Claire Johnson since her last visit with me.  Her insulin has been adjusted.  Type of Intervention:: Individual  Individual: Education  Education: Self-Management Skill Review, Nutrition and Meal Planning  Current diabetes risk level:: high  In the last 12 months, have you:: been admitted to a hospital  Was the ER or hospital admission related to diabetes?: Yes  Continuous Glucose Monitoring  Patient has CGM: Yes  GMI Date: 06/26/24  GMI Value: 8.3 %    Lab Results   Component Value Date    HGBA1C 10.7 (H) 06/09/2024     Diabetes Self-Management Skills Assessment    Diabetes Disease Process/Treatment Options  Patient/caregiver able to state what happens when someone has diabetes.: yes  Patient/caregiver knows what type of diabetes they have.: yes  Diabetes Type : Type I (updated cpeptide higher in normal range this time but antibodies positive confirming type 1 dm)  Assessment indicates:: Adequate understanding  Area of need?: No    Nutrition/Healthy Eating  Challenges to healthy eating:: portion control, snacking between meals and at night  Method of carbohydrate  measurement:: carb counting/reading labels  Patient can identify foods that impact blood sugar.: yes  Patient-identified foods:: starches (bread, pasta, rice, cereal), sweets, fruit/fruit juice, milk, soda  Nutrition/Healthy Eating Skills Assessment Completed:: Yes  Assessment indicates:: Instruction Needed  Area of need?: Yes    Medications  Patient is able to describe current diabetes management routine.: yes  Diabetes management routine:: insulin  Patient is able to identify current diabetes medications, dosages, and appropriate timing of medications.: no-Patient reports she forgot her supper base dose was increased to 4 units at last visit.  She had still been doing 2-2-2 plus sliding scale  Patient understands the purpose of the medications taken for diabetes.: yes  Patient reports problems or concerns with current medication regimen.: yes  Medication regimen problems/concerns:: unsure of doses  Medication Skills Assessment Completed:: Yes  Assessment indicates:: Instruction Needed  Area of need?: Yes    Home Blood Glucose Monitoring  Patient states that blood sugar is checked at home daily.: yes  Monitoring Method:: personal continuous glucose monitor  Patient is able to use personal CGM appropriately.: yes  CGM Report reviewed?: yes-reports reviewed with Claire prior to virtual visit  Home Blood Glucose Monitoring Skills Assessment Completed: : Yes  Assessment indicates:: Adequate understanding  Area of need?: No      Assessment Summary and Plan    Based on today's diabetes care assessment, the following areas of need were identified:          6/12/2024    12:01 AM   Social   Support No   Access to Mass Media/Tech No   Cognitive/Behavioral Health No   Culture/Catholic No   Communication No   Health Literacy No          6/12/2024    12:01 AM   Clinical   Medication Adherence No   Lab Compliance No   Nutritional Status No          6/26/2024    12:01 AM   Diabetes Self-Management Skills   Diabetes Disease  Process/Treatment Options No   Nutrition/Healthy Eating Yes- care plan updated   Medication Yes- care plan updated   Home Blood Glucose Monitoring No      Today's interventions were provided through individual discussion, instruction, and written materials were provided.      Patient verbalized understanding of instruction and written materials.  Pt was able to return back demonstration of instructions today. Patient understood key points, needs reinforcement and further instruction.     Diabetes Self-Management Care Plan:    Today's Diabetes Self-Management Care Plan was developed with Patricia's input. Patricia has agreed to work toward the following goal(s) to improve his/her overall diabetes control.      Care Plan: Diabetes Management   Updates made since 5/27/2024 12:00 AM        Problem: Healthy Eating         Goal: Eat 3 meals daily with <45g of Carbohydrate per meal.  Limit snacks to less than 15g.  Continue with sugar free drink options in place of Coke    Start Date: 6/12/2024   Expected End Date: 9/11/2024   This Visit's Progress: On track   Priority: Medium   Barriers: No Barriers Identified   Note:    6/26/24- Patient reports she is using [employer] alejandra to look up carb values on out to eat meals and reading labels otherwise.  She feels that the 45 grams of carb goal is working ok.  She is skipping some meals.  Discussed importance of eating 3 meals daily with both carb and protein source in both.  Reviewed protein snacks she can incorporate when she is hungry but blood sugars are high and appropriate amounts of carb per meal and snack.       Task: Reviewed the sources and role of Carbohydrate, Protein, and Fat and how each nutrient impacts blood sugar. Completed 6/12/2024        Task: Provided visual examples using dry measuring cups, food models, and other familiar objects such as computer mouse, deck or cards, tennis ball etc. to help with visualization of portions. Completed 6/12/2024        Task:  Explained how to count carbohydrates using the food label and the use of dry measuring cups for accurate carb counting. Completed 6/12/2024        Task: Discussed use of [employer] elif to help with out to eat options.  Elif downloaded at visit today Completed 6/12/2024        Task: Review the importance of balancing carbohydrates with each meal using portion control techniques to count servings of carbohydrate and label reading to identify serving size and amount of total carbs per serving. Completed 6/12/2024        Task: Provided Sample plate method and reviewed the use of the plate to estimate amounts of carbohydrate per meal. Completed 6/12/2024        Problem: Medications         Long-Range Goal: Patient Agrees to take insulin as prescribed.    Start Date: 6/12/2024   This Visit's Progress: Met   Priority: High   Barriers: No Barriers Identified   Note:    Dexcom reports reviewed by Claire.  She would like her to add a base dose of 2 units before meals.  She understands she will only take this when eating and before a meal.  She will use sliding scale of 1:50 over 150 in addition.  Reviewed use of sliding scale and specific examples provided.  Discussed that she can add her meal time insulin doses into her elif for review at clinic visits.      6/26/24- Patient is logging all insulin doses into Dexcom elif.  She is averaging 2-3 units per meal.  Her dose was increased to 2-2-4 at last visit with Claire but she forgot to do the base dose increase to 4 units and still dosing 2 plus sliding scale before meals.  Supper is usually between 8-9 pm.  She denies missing any insulin doses.  Reviewed prevention of stacking insulin doses.  Briefly reviewed possibility of Omni Pod 5 if she decides she wants to try insulin pump therapy.  She can come in for pump eval in the future to review options in more detail     Task: Reviewed with patient all current diabetes medications and provided basic review of the purpose, dosage,  frequency, side effects, and storage of both oral and injectable diabetes medications. Completed 6/12/2024        Task: Discussed guidelines for preventing, detecting and treating hypoglycemia and hyperglycemia and reviewed the importance of meal and medication timing with diabetes mediations for prevention of hypoglycemia and maximum drug benefit. Completed 6/12/2024        Follow Up Plan     Follow up in about 3 months (around 9/26/2024).  Patient will also meet with Claire carty for virtual visit.  Claire will clarify meal time dosing at that time.  Encouraged her call in interim with questions/concerns.      Today's care plan and follow up schedule was discussed with patient.  Patricia verbalized understanding of the care plan, goals, and agrees to follow up plan.        The patient was encouraged to communicate with his/her health care provider/physician and care team regarding his/her condition(s) and treatment.  I provided the patient with my contact information today and encouraged to contact me via phone or [facility] Patient Portal as needed.     Length of Visit   Total Time: 30 Minutes

## 2024-06-26 NOTE — PROGRESS NOTES
Subjective     Patient ID: Patricia Comer is a 23 y.o. female.    Chief Complaint:  Diabetes       The patient location is: home   The chief complaint leading to consultation is: diabetes     Visit type: audiovisual    Face to Face time with patient: 18 min   25  minutes of total time spent on the encounter, which includes face to face time and non-face to face time preparing to see the patient (eg, review of tests), Obtaining and/or reviewing separately obtained history, Documenting clinical information in the electronic or other health record, Independently interpreting results (not separately reported) and communicating results to the patient/family/caregiver, or Care coordination (not separately reported).       Each patient to whom he or she provides medical services by telemedicine is:  (1) informed of the relationship between the physician and patient and the respective role of any other health care provider with respect to management of the patient; and (2) notified that he or she may decline to receive medical services by telemedicine and may withdraw from such care at any time.    Notes:    VENITA  Pt is a 23 y.o. WF  with a diagnosis of diabetes mellitus diagnosed approximately last week after presenting to ER in [place] with polyuria and polydipsia.  Glucose was > 600 and A1c 10%. Pt started on insulin at that time       Interim Events: June 26, 2024:  GEOFF and Zinc T + for markers of Type 1 diabetes.  Sensor downloaded. Pt with definite pattern of marked hyperglycemia overnight.  Pt has exhibited marked insulin sensitivity to rapid acting insulin with 1 unit dropping glucoses about 70 points based on review of sensor reports in interim. No focal complaints or unexplained hypoglycemia (learning curve with matching insulin to meals). Pt is interested in a pump and with patterns and minute insulin needs I support.  I had recommened she increase supper novolog from 2 to 4 units to offset the marked glucose  elevations near midnight  (she eat around 8-9 pm), but she forgot.       Current DM meds:   lantus 20 qhs, Novolog 2-2-2 plus ss 1:50.        Failed DM meds:  none        Back up plan for insulin pump hiatus:   Statin: none        Not tolerated statin : none   ACE/ARB:none        Not tolerated ACE/ARB: none   Known Diabetic complications: none         Passwords for Tech Accounts: na      CGMS Interpretation:       Sensor/Pump Interpretation or Prominent Theme: Glucoses are running markedly high overnight starting before supper and last until morning. Some marked prandial glucose excursions with rather preciptious decline towards goal range.     June 14, 2024:  Pt new to me with presumably Type 1 Diabetes based on age, and no family hx of Type 1 or Type 2 DM.  No recent viral illnesses noted but was seen for nephrolithiasis in April for which was treated, but it was noted that she had also just completed abx  prior r/t  UTI and STI recently before.   Pt is currently using a sensor and did receive some basic education related to insulin and sensor use. Isolated hypglycemia r/t aggressive SS on d/c       Review of Systems   Constitutional:  Negative for activity change and fatigue.   HENT:  Negative for hearing loss and trouble swallowing.    Eyes:  Negative for photophobia and visual disturbance.        Last Eye Exam:    Respiratory:  Negative for cough and shortness of breath.    Cardiovascular:  Negative for chest pain and palpitations.   Gastrointestinal:  Negative for constipation and diarrhea.   Genitourinary:  Negative for frequency and urgency.   Musculoskeletal:  Negative for arthralgias and myalgias.   Integumentary:  Negative for rash and wound.   Neurological:  Negative for weakness and numbness.   Psychiatric/Behavioral:  Negative for sleep disturbance. The patient is not nervous/anxious.           Objective     Physical Exam  Constitutional:       Appearance: Normal appearance. She is normal weight.  "  HENT:      Head: Normocephalic and atraumatic.      Nose: Nose normal.      Mouth/Throat:      Mouth: Mucous membranes are moist.      Pharynx: Oropharynx is clear.   Eyes:      Extraocular Movements: Extraocular movements intact.      Conjunctiva/sclera: Conjunctivae normal.      Pupils: Pupils are equal, round, and reactive to light.   Neck:      Vascular: No carotid bruit.   Cardiovascular:      Rate and Rhythm: Normal rate and regular rhythm.      Pulses: Normal pulses.      Heart sounds: Normal heart sounds.   Pulmonary:      Effort: Pulmonary effort is normal.      Breath sounds: Normal breath sounds.   Musculoskeletal:         General: Normal range of motion.      Cervical back: Normal range of motion and neck supple.      Right lower leg: No edema.      Left lower leg: No edema.      Comments: Feet:   Lymphadenopathy:      Cervical: No cervical adenopathy.   Skin:     General: Skin is warm and dry.   Neurological:      General: No focal deficit present.      Mental Status: She is alert and oriented to person, place, and time.   Psychiatric:         Mood and Affect: Mood normal.         Behavior: Behavior normal.         Thought Content: Thought content normal.         Judgment: Judgment normal.           There were no vitals taken for this visit.    Hemoglobin A1C   Date Value Ref Range Status   06/09/2024 10.7 (H) <=6.5 % Final       Chemistry        Component Value Date/Time     06/14/2024 0938    K 4.4 06/14/2024 0938     06/14/2024 0938    CO2 23 06/14/2024 0938    BUN 11 06/14/2024 0938    CREATININE 0.8 06/14/2024 0938     (H) 06/14/2024 0938        Component Value Date/Time    CALCIUM 9.7 06/14/2024 0938    ALKPHOS 72 06/14/2024 0938    AST 17 06/14/2024 0938    ALT 15 06/14/2024 0938    BILITOT 0.6 06/14/2024 0938    ESTGFRAFRICA 127 09/13/2022 1516    ESTGFRAFRICA >60 02/18/2022 1314    EGFRNONAA >60 02/18/2022 1314          No results found for: "CHOL"  No results found for: " ""HDL"  No results found for: "LDLCALC"  No results found for: "TRIG"  No results found for: "CHOLHDL"  No results found for: "MICALBCREAT"  Lab Results   Component Value Date    TSH 1.868 06/14/2024     No results found for: "SEHETDGS19LS"  Component      Latest Ref Rng 6/14/2024   ISLET CELL AB      <1:4  <1:4    Glutamic Acid Decarb Ab      <=0.02 nmol/L 4.55 (H)    C-Peptide      0.78 - 5.19 ng/mL 1.43    Zinc Transporter 8 (ZnT8) Antibody      <15.0 U/mL 17.9 (H)       Legend:  (H) High     Assessment and Plan     1. Type 1 diabetes mellitus without complication                ORDERS 06/26/2024     Cons Diabetes ED---review different pump options, fine tune carb counting, etc.     F/u me  in 2 months with A1c prior  (or same day).      "

## 2024-07-08 DIAGNOSIS — E10.9 TYPE 1 DIABETES MELLITUS WITHOUT COMPLICATION: Primary | ICD-10-CM

## 2024-07-08 RX ORDER — GLUCAGON 3 MG/1
POWDER NASAL
Qty: 1 EACH | Refills: 1 | Status: SHIPPED | OUTPATIENT
Start: 2024-07-08

## 2024-07-12 DIAGNOSIS — E10.9 TYPE 1 DIABETES MELLITUS WITHOUT COMPLICATION: Primary | ICD-10-CM

## 2024-07-12 RX ORDER — INSULIN LISPRO 100 [IU]/ML
INJECTION, SOLUTION INTRAVENOUS; SUBCUTANEOUS
Qty: 34.2 ML | Refills: 1 | Status: SHIPPED | OUTPATIENT
Start: 2024-07-12

## 2024-07-30 ENCOUNTER — OFFICE VISIT (OUTPATIENT)
Dept: ENDOCRINOLOGY | Facility: CLINIC | Age: 24
End: 2024-07-30
Payer: MEDICAID

## 2024-07-30 DIAGNOSIS — E10.9 TYPE 1 DIABETES MELLITUS WITHOUT COMPLICATION: Primary | ICD-10-CM

## 2024-07-30 DIAGNOSIS — R11.2 NAUSEA AND VOMITING, UNSPECIFIED VOMITING TYPE: ICD-10-CM

## 2024-07-30 PROCEDURE — 3046F HEMOGLOBIN A1C LEVEL >9.0%: CPT | Mod: CPTII,95,, | Performed by: NURSE PRACTITIONER

## 2024-07-30 PROCEDURE — 95251 CONT GLUC MNTR ANALYSIS I&R: CPT | Mod: NDTC,,, | Performed by: NURSE PRACTITIONER

## 2024-07-30 PROCEDURE — 99214 OFFICE O/P EST MOD 30 MIN: CPT | Mod: 95,,, | Performed by: NURSE PRACTITIONER

## 2024-07-31 RX ORDER — BLOOD-GLUCOSE SENSOR
1 EACH MISCELLANEOUS
Qty: 9 EACH | Refills: 3 | Status: SHIPPED | OUTPATIENT
Start: 2024-07-31 | End: 2025-07-31

## 2024-07-31 RX ORDER — INSULIN PMP CART,AUT,G6/7,CNTR
EACH SUBCUTANEOUS
Qty: 30 EACH | Refills: 3 | Status: SHIPPED | OUTPATIENT
Start: 2024-07-31

## 2024-07-31 NOTE — PROGRESS NOTES
Subjective     Patient ID: Patricia Comer is a 23 y.o. female.    Chief Complaint:  Diabetes       The patient location is: home   The chief complaint leading to consultation is: diabetes     Visit type: audiovisual    Face to Face time with patient: 10 min   25  minutes of total time spent on the encounter, which includes face to face time and non-face to face time preparing to see the patient (eg, review of tests), Obtaining and/or reviewing separately obtained history, Documenting clinical information in the electronic or other health record, Independently interpreting results (not separately reported) and communicating results to the patient/family/caregiver, or Care coordination (not separately reported).       Each patient to whom he or she provides medical services by telemedicine is:  (1) informed of the relationship between the physician and patient and the respective role of any other health care provider with respect to management of the patient; and (2) notified that he or she may decline to receive medical services by telemedicine and may withdraw from such care at any time.    Notes:    VENITA  Pt is a 23 y.o. WF  with a diagnosis of diabetes mellitus diagnosed approximately last week after presenting to ER in [place] with polyuria and polydipsia.  Glucose was > 600 and A1c 10%. Pt started on insulin at that time       Interim Events: July 30, 2024:  1 mo f/u.  Pt currently with nausea/vomiting--thought no vomiting since this AM.  She did check for ketones and urine was trace.  Sensor downloaded.  Patterns remain similar from a month ago.  Pt only taking 7 of lantus qhs, and a very conservative SS of 1:75 starting at 180.  She is not covering for carbs, as she is so sensitive that her glucoses drop quite low .     Current DM meds:   lantus 20 qhs, Novolog 2-2-2 plus ss 1:50.        Failed DM meds:  none        Back up plan for insulin pump hiatus:   Statin: none        Not tolerated statin : none    ACE/ARB:none        Not tolerated ACE/ARB: none   Known Diabetic complications: none         Passwords for Tech Accounts: na      CGMS Interpretation:               Sensor/Pump Interpretation or Prominent Theme: Glucoses are running markedly high starting usually early afternoon a and last until morning. No hypoglycemia.        June 26, 2024:  GEOFF and Zinc T + for markers of Type 1 diabetes.  Sensor downloaded. Pt with definite pattern of marked hyperglycemia overnight.  Pt has exhibited marked insulin sensitivity to rapid acting insulin with 1 unit dropping glucoses about 70 points based on review of sensor reports in interim. No focal complaints or unexplained hypoglycemia (learning curve with matching insulin to meals). Pt is interested in a pump and with patterns and minute insulin needs I support.  I had recommened she increase supper novolog from 2 to 4 units to offset the marked glucose elevations near midnight  (she eat around 8-9 pm), but she forgot.       June 14, 2024:  Pt new to me with presumably Type 1 Diabetes based on age, and no family hx of Type 1 or Type 2 DM.  No recent viral illnesses noted but was seen for nephrolithiasis in April for which was treated, but it was noted that she had also just completed abx  prior r/t  UTI and STI recently before.   Pt is currently using a sensor and did receive some basic education related to insulin and sensor use. Isolated hypglycemia r/t aggressive SS on d/c       Review of Systems   Constitutional:  Negative for activity change and fatigue.   HENT:  Negative for hearing loss and trouble swallowing.    Eyes:  Negative for photophobia and visual disturbance.        Last Eye Exam:    Respiratory:  Negative for cough and shortness of breath.    Cardiovascular:  Negative for chest pain and palpitations.   Gastrointestinal:  Positive for nausea and vomiting. Negative for constipation and diarrhea.   Genitourinary:  Negative for frequency and urgency.  "  Musculoskeletal:  Negative for arthralgias and myalgias.   Integumentary:  Negative for rash and wound.   Neurological:  Negative for weakness and numbness.   Psychiatric/Behavioral:  Negative for sleep disturbance. The patient is not nervous/anxious.           Objective     Physical Exam  Constitutional:       Appearance: Normal appearance.   HENT:      Head: Normocephalic and atraumatic.      Nose: Nose normal.   Neurological:      General: No focal deficit present.      Mental Status: She is alert and oriented to person, place, and time.   Psychiatric:         Mood and Affect: Mood normal.         Behavior: Behavior normal.         Thought Content: Thought content normal.         Judgment: Judgment normal.         There were no vitals taken for this visit.    Hemoglobin A1C   Date Value Ref Range Status   06/09/2024 10.7 (H) <=6.5 % Final       Chemistry        Component Value Date/Time     06/14/2024 0938    K 4.4 06/14/2024 0938     06/14/2024 0938    CO2 23 06/14/2024 0938    BUN 11 06/14/2024 0938    CREATININE 0.8 06/14/2024 0938     (H) 06/14/2024 0938        Component Value Date/Time    CALCIUM 9.7 06/14/2024 0938    ALKPHOS 72 06/14/2024 0938    AST 17 06/14/2024 0938    ALT 15 06/14/2024 0938    BILITOT 0.6 06/14/2024 0938    ESTGFRAFRICA 127 09/13/2022 1516    ESTGFRAFRICA >60 02/18/2022 1314    EGFRNONAA >60 02/18/2022 1314          No results found for: "CHOL"  No results found for: "HDL"  No results found for: "LDLCALC"  No results found for: "TRIG"  No results found for: "CHOLHDL"  No results found for: "MICALBCREAT"  Lab Results   Component Value Date    TSH 1.868 06/14/2024     No results found for: "[id number]"  Component      Latest Ref Rng 6/14/2024   ISLET CELL AB      <1:4  <1:4    Glutamic Acid Decarb Ab      <=0.02 nmol/L 4.55 (H)    C-Peptide      0.78 - 5.19 ng/mL 1.43    Zinc Transporter 8 (ZnT8) Antibody      <15.0 U/mL 17.9 (H)       Legend:  (H) High   "   Assessment and Plan     1. Type 1 diabetes mellitus without complication  insulin  cart,aut,G6/7,cntr Crtg    insulin pump cart,auto,BT,G6/7 (OMNIPOD 5 G6-G7 PODS, GEN 5,) Crtg    blood-glucose sensor (DEXCOM G7 SENSOR) [place]      2. Nausea and vomiting, unspecified vomiting type              Pt with marked ISF and CHO, and some unsual and quite distinct patterns.     Will proceed with  procurement of Omnipod G5  Pt with projected CHO 1:35, and ISF of near 130. .    0.3 u/hr      Reinforced sick day care, Sugar free hydration, use of correction dose prn   ORDERS 07/31/2024     F/u me  in 2 months with A1c prior  (or same day).

## 2024-10-09 RX ORDER — INSULIN PMP CART,AUT,G6/7,CNTR
1 EACH SUBCUTANEOUS CONTINUOUS
Qty: 1 EACH | Refills: 0 | Status: SHIPPED | OUTPATIENT
Start: 2024-10-09

## 2024-10-09 RX ORDER — INSULIN PMP CART,AUT,G6/7,CNTR
EACH SUBCUTANEOUS
Qty: 30 EACH | Refills: 0 | Status: SHIPPED | OUTPATIENT
Start: 2024-10-09

## 2024-11-07 DIAGNOSIS — E10.9 TYPE 1 DIABETES MELLITUS WITHOUT COMPLICATION: Primary | ICD-10-CM

## 2024-11-07 RX ORDER — PEN NEEDLE, DIABETIC 30 GX3/16"
NEEDLE, DISPOSABLE MISCELLANEOUS
Qty: 400 EACH | Refills: 3 | Status: SHIPPED | OUTPATIENT
Start: 2024-11-07

## 2024-11-08 ENCOUNTER — OFFICE VISIT (OUTPATIENT)
Dept: ENDOCRINOLOGY | Facility: CLINIC | Age: 24
End: 2024-11-08
Payer: MEDICAID

## 2024-11-08 DIAGNOSIS — E10.9 TYPE 1 DIABETES MELLITUS WITHOUT COMPLICATION: Primary | ICD-10-CM

## 2024-11-08 NOTE — PROGRESS NOTES
Subjective     Patient ID: Patricia Comer is a 23 y.o. female.    Chief Complaint:  Diabetes       The patient location is: home   The chief complaint leading to consultation is: diabetes     Visit type: audiovisual    Face to Face time with patient: 5 min   25  minutes of total time spent on the encounter, which includes face to face time and non-face to face time preparing to see the patient (eg, review of tests), Obtaining and/or reviewing separately obtained history, Documenting clinical information in the electronic or other health record, Independently interpreting results (not separately reported) and communicating results to the patient/family/caregiver, or Care coordination (not separately reported).       Each patient to whom he or she provides medical services by telemedicine is:  (1) informed of the relationship between the physician and patient and the respective role of any other health care provider with respect to management of the patient; and (2) notified that he or she may decline to receive medical services by telemedicine and may withdraw from such care at any time.    Notes:    HPI  Pt is a 23 y.o. WF  with a diagnosis of diabetes mellitus diagnosed approximately last week after presenting to ER in [place] with polyuria and polydipsia.  Glucose was > 600 and A1c 10%. Pt started on insulin at that time       Interim Events: Nov 8, 2024:  No acute events or focal complaints--though pt just woke up and barely responsive.  Is pending insulin pump (Omnipod start). Sensor downloaded and reviewed.  Pt with marked sensitivity--and per her mother glucose was > 500 the other day and she only took 3 units yet her dropped her into the 100-200 range--so anticipate ISF 1:75 to 1:100.  Not carb counting, just using correction.      Current DM meds:   lantus 9qhs, Novolog 2-2-2 plus ss 1:50.        Failed DM meds:  none        Back up plan for insulin pump hiatus:   Statin: none        Not tolerated statin  : none   ACE/ARB:none        Not tolerated ACE/ARB: none   Known Diabetic complications: none         Passwords for Tech Accounts: na      CGMS Interpretation:               Sensor/Pump Interpretation or Prominent Theme: Global hyperglycemia. NO hypoglycemia.      July 30, 2024:  1 mo f/u.  Pt currently with nausea/vomiting--thought no vomiting since this AM.  She did check for ketones and urine was trace.  Sensor downloaded.  Patterns remain similar from a month ago.  Pt only taking 7 of lantus qhs, and a very conservative SS of 1:75 starting at 180.  She is not covering for carbs, as she is so sensitive that her glucoses drop quite low .       June 26, 2024:  GEOFF and Zinc T + for markers of Type 1 diabetes.  Sensor downloaded. Pt with definite pattern of marked hyperglycemia overnight.  Pt has exhibited marked insulin sensitivity to rapid acting insulin with 1 unit dropping glucoses about 70 points based on review of sensor reports in interim. No focal complaints or unexplained hypoglycemia (learning curve with matching insulin to meals). Pt is interested in a pump and with patterns and minute insulin needs I support.  I had recommened she increase supper novolog from 2 to 4 units to offset the marked glucose elevations near midnight  (she eat around 8-9 pm), but she forgot.       June 14, 2024:  Pt new to me with presumably Type 1 Diabetes based on age, and no family hx of Type 1 or Type 2 DM.  No recent viral illnesses noted but was seen for nephrolithiasis in April for which was treated, but it was noted that she had also just completed abx  prior r/t  UTI and STI recently before.   Pt is currently using a sensor and did receive some basic education related to insulin and sensor use. Isolated hypglycemia r/t aggressive SS on d/c       Review of Systems   Constitutional:  Negative for activity change and fatigue.        There were no vitals taken for this visit.   HENT:  Negative for hearing loss and trouble  "swallowing.    Eyes:  Negative for photophobia and visual disturbance.        Last Eye Exam:    Respiratory:  Negative for cough and shortness of breath.    Cardiovascular:  Negative for chest pain and palpitations.   Gastrointestinal:  Negative for constipation, diarrhea, nausea and vomiting.   Genitourinary:  Negative for frequency and urgency.   Musculoskeletal:  Negative for arthralgias and myalgias.   Integumentary:  Negative for rash and wound.   Neurological:  Negative for weakness and numbness.   Psychiatric/Behavioral:  Negative for sleep disturbance. The patient is not nervous/anxious.           Objective     Physical Exam  Constitutional:       Appearance: Normal appearance.   HENT:      Head: Normocephalic and atraumatic.      Nose: Nose normal.   Neurological:      General: No focal deficit present.      Mental Status: She is alert and oriented to person, place, and time.   Psychiatric:         Mood and Affect: Mood normal.         Behavior: Behavior normal.         Thought Content: Thought content normal.         Judgment: Judgment normal.           There were no vitals taken for this visit.    Hemoglobin A1C   Date Value Ref Range Status   06/09/2024 10.7 (H) <=6.5 % Final       Chemistry        Component Value Date/Time     06/14/2024 0938    K 4.4 06/14/2024 0938     06/14/2024 0938    CO2 23 06/14/2024 0938    BUN 11 06/14/2024 0938    CREATININE 0.8 06/14/2024 0938     (H) 06/14/2024 0938        Component Value Date/Time    CALCIUM 9.7 06/14/2024 0938    ALKPHOS 72 06/14/2024 0938    AST 17 06/14/2024 0938    ALT 15 06/14/2024 0938    BILITOT 0.6 06/14/2024 0938    ESTGFRAFRICA 127 09/13/2022 1516    ESTGFRAFRICA >60 02/18/2022 1314    EGFRNONAA >60 02/18/2022 1314          No results found for: "CHOL"  No results found for: "HDL"  No results found for: "LDLCALC"  No results found for: "TRIG"  No results found for: "CHOLHDL"  No results found for: "MICALBCREAT"  Lab Results " "  Component Value Date    TSH 1.868 06/14/2024     No results found for: "[id number]"  Component      Latest Ref Rng 6/14/2024   ISLET CELL AB      <1:4  <1:4    Glutamic Acid Decarb Ab      <=0.02 nmol/L 4.55 (H)    C-Peptide      0.78 - 5.19 ng/mL 1.43    Zinc Transporter 8 (ZnT8) Antibody      <15.0 U/mL 17.9 (H)       Legend:  (H) High     Assessment and Plan     1. Type 1 diabetes mellitus without complication            Pt with marked ISF and CHO, and some unsual and quite distinct patterns.     Pt currently taking 9 lantus and marked hyperglycemia even when sleeping.   Will calculate based on 10 basal so TDD suggestive of 20 and still be somewhat conservative.     PUmp start orders:    Basal -0.35 u/hr ATC  CHO 1:30.  ISF 1:90.     Goals--140 judith      Reinforced sick day care, Sugar free hydration, use of correction dose prn     ORDERS 11/08/2024     F/u me  in 2 months with A1c prior  (or same day).          "

## 2025-04-30 DIAGNOSIS — E10.9 TYPE 1 DIABETES MELLITUS WITHOUT COMPLICATION: ICD-10-CM

## 2025-04-30 RX ORDER — INSULIN ASPART 100 [IU]/ML
INJECTION, SOLUTION INTRAVENOUS; SUBCUTANEOUS
Qty: 34.2 ML | Refills: 1 | Status: SHIPPED | OUTPATIENT
Start: 2025-04-30

## 2025-04-30 RX ORDER — PEN NEEDLE, DIABETIC 30 GX3/16"
NEEDLE, DISPOSABLE MISCELLANEOUS
Qty: 400 EACH | Refills: 3 | Status: SHIPPED | OUTPATIENT
Start: 2025-04-30

## 2025-05-02 ENCOUNTER — TELEPHONE (OUTPATIENT)
Dept: ENDOCRINOLOGY | Facility: CLINIC | Age: 25
End: 2025-05-02
Payer: MEDICAID

## 2025-05-02 NOTE — TELEPHONE ENCOUNTER
Initiated PA for [employer]ect[employer] Pen Needle Dariana 2 Gen 32G X 4 MM via CoverMyMeds     Key: [id number]    PA Case ID #: [id number]    Rx #: [id number]    Awaiting determination

## 2025-05-07 ENCOUNTER — TELEPHONE (OUTPATIENT)
Dept: ENDOCRINOLOGY | Facility: CLINIC | Age: 25
End: 2025-05-07
Payer: MEDICAID

## 2025-05-07 NOTE — TELEPHONE ENCOUNTER
----- Message from Stormy sent at 5/7/2025  2:29 PM CDT -----  Type:  Insurance Returning CallWho Called:Deena from [facility] [employer] Does the patient know what this is regarding?:yes Would the patient rather a call back or a response via [facility]? Call Best Call Back Number:[phone] Information: She states that she is calling about the appeal for the patient please call

## 2025-05-07 NOTE — TELEPHONE ENCOUNTER
Fax received that [place] [employer] will not consider appeal for jasmine pen needles without pts consent .